# Patient Record
Sex: MALE | Race: WHITE
[De-identification: names, ages, dates, MRNs, and addresses within clinical notes are randomized per-mention and may not be internally consistent; named-entity substitution may affect disease eponyms.]

---

## 2018-02-25 ENCOUNTER — HOSPITAL ENCOUNTER (EMERGENCY)
Dept: HOSPITAL 47 - EC | Age: 43
Discharge: HOME | End: 2018-02-25
Payer: MEDICAID

## 2018-02-25 VITALS
HEART RATE: 76 BPM | TEMPERATURE: 97.6 F | SYSTOLIC BLOOD PRESSURE: 104 MMHG | RESPIRATION RATE: 16 BRPM | DIASTOLIC BLOOD PRESSURE: 61 MMHG

## 2018-02-25 DIAGNOSIS — Z79.891: ICD-10-CM

## 2018-02-25 DIAGNOSIS — F17.200: ICD-10-CM

## 2018-02-25 DIAGNOSIS — J18.1: Primary | ICD-10-CM

## 2018-02-25 DIAGNOSIS — R10.9: ICD-10-CM

## 2018-02-25 LAB
ALBUMIN SERPL-MCNC: 3.9 G/DL (ref 3.5–5)
ALP SERPL-CCNC: 39 U/L (ref 38–126)
ALT SERPL-CCNC: 27 U/L (ref 21–72)
AMYLASE SERPL-CCNC: 46 U/L (ref 30–110)
ANION GAP SERPL CALC-SCNC: 10 MMOL/L
AST SERPL-CCNC: 25 U/L (ref 17–59)
BASOPHILS # BLD AUTO: 0 K/UL (ref 0–0.2)
BASOPHILS NFR BLD AUTO: 1 %
BUN SERPL-SCNC: 15 MG/DL (ref 9–20)
CALCIUM SPEC-MCNC: 9 MG/DL (ref 8.4–10.2)
CHLORIDE SERPL-SCNC: 104 MMOL/L (ref 98–107)
CO2 SERPL-SCNC: 26 MMOL/L (ref 22–30)
EOSINOPHIL # BLD AUTO: 0 K/UL (ref 0–0.7)
EOSINOPHIL NFR BLD AUTO: 0 %
ERYTHROCYTE [DISTWIDTH] IN BLOOD BY AUTOMATED COUNT: 4.43 M/UL (ref 4.3–5.9)
ERYTHROCYTE [DISTWIDTH] IN BLOOD: 12.3 % (ref 11.5–15.5)
GLUCOSE SERPL-MCNC: 105 MG/DL (ref 74–99)
HCT VFR BLD AUTO: 41.4 % (ref 39–53)
HGB BLD-MCNC: 13.5 GM/DL (ref 13–17.5)
KETONES UR QL STRIP.AUTO: (no result)
LIPASE SERPL-CCNC: 29 U/L (ref 23–300)
LYMPHOCYTES # SPEC AUTO: 1.5 K/UL (ref 1–4.8)
LYMPHOCYTES NFR SPEC AUTO: 31 %
MCH RBC QN AUTO: 30.4 PG (ref 25–35)
MCHC RBC AUTO-ENTMCNC: 32.6 G/DL (ref 31–37)
MCV RBC AUTO: 93.3 FL (ref 80–100)
MONOCYTES # BLD AUTO: 0.5 K/UL (ref 0–1)
MONOCYTES NFR BLD AUTO: 10 %
NEUTROPHILS # BLD AUTO: 2.6 K/UL (ref 1.3–7.7)
NEUTROPHILS NFR BLD AUTO: 54 %
PH UR: 6 [PH] (ref 5–8)
PLATELET # BLD AUTO: 145 K/UL (ref 150–450)
POTASSIUM SERPL-SCNC: 4.4 MMOL/L (ref 3.5–5.1)
PROT SERPL-MCNC: 6.9 G/DL (ref 6.3–8.2)
SODIUM SERPL-SCNC: 140 MMOL/L (ref 137–145)
SP GR UR: 1.05 (ref 1–1.03)
UROBILINOGEN UR QL STRIP: <2 MG/DL (ref ?–2)
WBC # BLD AUTO: 4.9 K/UL (ref 3.8–10.6)

## 2018-02-25 PROCEDURE — 83690 ASSAY OF LIPASE: CPT

## 2018-02-25 PROCEDURE — 87040 BLOOD CULTURE FOR BACTERIA: CPT

## 2018-02-25 PROCEDURE — 85025 COMPLETE CBC W/AUTO DIFF WBC: CPT

## 2018-02-25 PROCEDURE — 74177 CT ABD & PELVIS W/CONTRAST: CPT

## 2018-02-25 PROCEDURE — 82150 ASSAY OF AMYLASE: CPT

## 2018-02-25 PROCEDURE — 36415 COLL VENOUS BLD VENIPUNCTURE: CPT

## 2018-02-25 PROCEDURE — 81003 URINALYSIS AUTO W/O SCOPE: CPT

## 2018-02-25 PROCEDURE — 99284 EMERGENCY DEPT VISIT MOD MDM: CPT

## 2018-02-25 PROCEDURE — 80053 COMPREHEN METABOLIC PANEL: CPT

## 2018-02-25 PROCEDURE — 96374 THER/PROPH/DIAG INJ IV PUSH: CPT

## 2018-02-25 PROCEDURE — 96361 HYDRATE IV INFUSION ADD-ON: CPT

## 2018-02-25 PROCEDURE — 71046 X-RAY EXAM CHEST 2 VIEWS: CPT

## 2018-02-25 NOTE — XR
EXAMINATION TYPE: XR chest 2V

 

DATE OF EXAM ORDERED: 2/25/2018

 

HISTORY: cough.

 

REFERENCE: None.

 

FINDINGS: 

The lungs are clear. Pleural spaces are clear. Heart size is normal.

 

IMPRESSION:

 

NORMAL CHEST.

## 2018-02-25 NOTE — ED
General Adult HPI





- General


Chief complaint: Abdominal Pain


Stated complaint: Abdominal pain


Time Seen by Provider: 02/25/18 10:01


Source: patient, RN notes reviewed


Mode of arrival: ambulatory


Limitations: no limitations





- History of Present Illness


Initial comments: 





43-year-old male presents to the emergency department with a chief complaint of 

centralized abdominal pain.  He states that he had fever like symptoms 

beginning of the week last week towards the end he started to develop the 

centralized abdominal pain is slowly gotten worse.  He states he's had some 

nausea.  He's had some diarrhea.  He denies any cough cold like symptoms.  He 

states he's never had any abdominal pain like this before.  He states that he 

did take a lot of Motrin last week he does not know if it's related to that.  

He was concerned due to his continued pain and history of fever so they thought 

that he should be seen.Patient denies any recent shortness of breath, chest pain

, back pain, vomiting, numbness or tingling, dysuria or hematuria, constipation

, headaches or visual changes, or any other current symptoms.





- Related Data


 Home Medications











 Medication  Instructions  Recorded  Confirmed


 


Buprenorphine HCl [Subutex] 4 mg SL DAILY 02/25/18 02/25/18








 Previous Rx's











 Medication  Instructions  Recorded


 


Ibuprofen [Motrin] 600 mg PO Q6HR PRN #20 tab 02/25/18


 


Levofloxacin [Levaquin] 750 mg PO DAILY #7 tab 02/25/18











 Allergies











Allergy/AdvReac Type Severity Reaction Status Date / Time


 


No Known Allergies Allergy   Verified 02/25/18 10:13














Review of Systems


ROS Statement: 


Those systems with pertinent positive or pertinent negative responses have been 

documented in the HPI.





ROS Other: All systems not noted in ROS Statement are negative.





Past Medical History


Past Medical History: No Reported History


History of Any Multi-Drug Resistant Organisms: None Reported


Past Surgical History: No Surgical Hx Reported


Past Psychological History: No Psychological Hx Reported


Smoking Status: Current every day smoker


Past Alcohol Use History: Rare


Past Drug Use History: None Reported





General Exam





- General Exam Comments


Initial Comments: 





General:  The patient is awake and alert, in no distress, and does not appear 

acutely ill. 


Eye:  Pupils are equal, round and reactive to light, extra-ocular movements are 

intact; there is normal conjunctiva bilaterally.  No signs of icterus.  


Ears, nose, mouth and throat:  There are moist mucous membranes.


Neck:  The neck is supple, there is no tenderness.


Cardiovascular:  There is a regular rate and rhythm. No murmur, rub or gallop 

is appreciated.


Respiratory:  Lungs are clear to auscultation, respirations are non-labored, 

breath sounds are equal.  No wheezes, stridor, rales, or rhonchi.


Gastrointestinal:  Soft, non-distended, non-tender abdomen without masses or 

organomegaly noted. There is no rebound or guarding present.  No CVA 

tenderness. Bowel sounds are unremarkable.


Back:  There is no tenderness to palpation in the midline. There is no obvious 

deformity. No rashes noted. 


Musculoskeletal:  Normal ROM, no tenderness, There is no pedal edema. There is 

no calf tenderness or swelling. Sensation intact. Pulses equal bilaterally 2+.  


Neurological:  CN II-XII intact, There are no obvious motor or sensory 

deficits. Coordination appears grossly intact. Speech is normal.


Skin:  Skin is warm and dry and no rashes or lesions are noted. 


Psychiatric:  Cooperative, appropriate mood & affect, normal judgment.  





Limitations: no limitations





Course


 Vital Signs











  02/25/18





  09:53


 


Temperature 98.6 F


 


Pulse Rate 115 H


 


Respiratory 18





Rate 


 


Blood Pressure 108/72


 


O2 Sat by Pulse 96





Oximetry 














Medical Decision Making





- Medical Decision Making





43-year-old male presents to the emergency department with a chief complaint of 

centralized abdominal pain.  At this time CAT scan and lab work is been 

reviewed.  There is suspicion for a left lower lobe pneumonia.  He has had the 

cough and the fever with abdominal pain.  We did discuss that we will start him 

on antibiotics for this as well as Motrin.  We discussed return parameters and 

follow-up and all they understood and management this plan.  They will be 

discharged.





- Lab Data


Result diagrams: 


 02/25/18 10:20





 02/25/18 10:20


 Lab Results











  02/25/18 02/25/18 02/25/18 Range/Units





  10:20 10:20 10:20 


 


WBC   4.9   (3.8-10.6)  k/uL


 


RBC   4.43   (4.30-5.90)  m/uL


 


Hgb   13.5   (13.0-17.5)  gm/dL


 


Hct   41.4   (39.0-53.0)  %


 


MCV   93.3   (80.0-100.0)  fL


 


MCH   30.4   (25.0-35.0)  pg


 


MCHC   32.6   (31.0-37.0)  g/dL


 


RDW   12.3   (11.5-15.5)  %


 


Plt Count   145 L   (150-450)  k/uL


 


Neutrophils %   54   %


 


Lymphocytes %   31   %


 


Monocytes %   10   %


 


Eosinophils %   0   %


 


Basophils %   1   %


 


Neutrophils #   2.6   (1.3-7.7)  k/uL


 


Lymphocytes #   1.5   (1.0-4.8)  k/uL


 


Monocytes #   0.5   (0-1.0)  k/uL


 


Eosinophils #   0.0   (0-0.7)  k/uL


 


Basophils #   0.0   (0-0.2)  k/uL


 


Sodium  140    (137-145)  mmol/L


 


Potassium  4.4    (3.5-5.1)  mmol/L


 


Chloride  104    ()  mmol/L


 


Carbon Dioxide  26    (22-30)  mmol/L


 


Anion Gap  10    mmol/L


 


BUN  15    (9-20)  mg/dL


 


Creatinine  0.63 L    (0.66-1.25)  mg/dL


 


Est GFR (MDRD) Af Amer  >60    (>60 ml/min/1.73 sqM)  


 


Est GFR (MDRD) Non-Af  >60    (>60 ml/min/1.73 sqM)  


 


Glucose  105 H    (74-99)  mg/dL


 


Calcium  9.0    (8.4-10.2)  mg/dL


 


Total Bilirubin  0.5    (0.2-1.3)  mg/dL


 


AST  25    (17-59)  U/L


 


ALT  27    (21-72)  U/L


 


Alkaline Phosphatase  39    ()  U/L


 


Total Protein  6.9    (6.3-8.2)  g/dL


 


Albumin  3.9    (3.5-5.0)  g/dL


 


Amylase  46    ()  U/L


 


Lipase  29    ()  U/L


 


Urine Color    Yellow  


 


Urine Appearance    Clear  (Clear)  


 


Urine pH    6.0  (5.0-8.0)  


 


Ur Specific Gravity    1.048 H  (1.001-1.035)  


 


Urine Protein    Trace H  (Negative)  


 


Urine Glucose (UA)    Negative  (Negative)  


 


Urine Ketones    1+ H  (Negative)  


 


Urine Blood    Negative  (Negative)  


 


Urine Nitrite    Negative  (Negative)  


 


Urine Bilirubin    Negative  (Negative)  


 


Urine Urobilinogen    <2.0  (<2.0)  mg/dL


 


Ur Leukocyte Esterase    Negative  (Negative)  














- Radiology Data


Radiology results: report reviewed, image reviewed





Disposition


Clinical Impression: 


 Left lower lobe pneumonia





Disposition: HOME SELF-CARE


Condition: Stable


Instructions:  Bacterial Pneumonia (ED)


Additional Instructions: 


Please use medication as discussed. Please follow up with family doctor if 

symptoms have not improved over the next two days. Please return to the 

emergency room if your symptoms increase or worsen or for any other concerns. 


Prescriptions: 


Ibuprofen [Motrin] 600 mg PO Q6HR PRN #20 tab


 PRN Reason: Pain


Levofloxacin [Levaquin] 750 mg PO DAILY #7 tab


Referrals: 


Jarred Mascorro MD [Primary Care Provider] - 1-2 days


Time of Disposition: 12:21

## 2018-02-25 NOTE — CT
EXAMINATION TYPE: CT abdomen pelvis w con

 

DATE OF EXAM: 2/25/2018

 

REFERENCE: NONE

 

HISTORY: Pain

HISTORY: Abdominal pain

 

REFERENCE: NONE

 

CT DLP: 423.10 mGy

Automated exposure control for dose reduction was used.

 

TECHNIQUE: Helical acquisition through the abdomen and pelvis was obtained following the oral ingesti
on of without Oral Contrast and following intravenous administration of 100 ml mL of Omnipaque 300. T
he data was reformatted in axial, coronal and sagittal projections.

 

FINDINGS:  There is a left lower lobe infiltrate. Right lung is clear. There is no pleural or pericar
dial fluid. The heart is not enlarged.

 

Within the abdomen, the gallbladder is partially contracted. The liver and spleen appear normal.

 

Both adrenal glands are normal. Both kidneys demonstrate function and appear morphologically normal.

 

The pancreas is unremarkable.

 

There is no significant retroperitoneal, iliac or inguinal adenopathy.

 

The bladder is unremarkable.

 

There is no significant diverticular change and there is no radiographic evidence of diverticulitis. 
The appendix is not visualized with certainty. There is moderate stool within the colon.

 

Small bowel loops are of normal caliber.

 

No free fluid and no free air is seen.

 

No bony lesion is seen.

 

IMPRESSION: 

1. LEFT LOWER LOBE INFILTRATE, LIKELY REPRESENTING PNEUMONIA.

2. NO ACUTE INTRA-ABDOMINAL INFLAMMATORY PROCESS.

3. NO EVIDENCE OF NEPHROLITHIASIS OR HYDRONEPHROSIS.

4. NONVISUALIZATION OF THE APPENDIX.

## 2018-02-28 ENCOUNTER — HOSPITAL ENCOUNTER (OUTPATIENT)
Dept: HOSPITAL 47 - ORWHC2ENDO | Age: 43
Discharge: HOME | End: 2018-02-28
Payer: MEDICAID

## 2018-02-28 VITALS — HEART RATE: 78 BPM | RESPIRATION RATE: 16 BRPM | SYSTOLIC BLOOD PRESSURE: 117 MMHG | DIASTOLIC BLOOD PRESSURE: 74 MMHG

## 2018-02-28 VITALS — TEMPERATURE: 98.4 F

## 2018-02-28 VITALS — BODY MASS INDEX: 20.4 KG/M2

## 2018-02-28 DIAGNOSIS — Z79.2: ICD-10-CM

## 2018-02-28 DIAGNOSIS — K29.50: Primary | ICD-10-CM

## 2018-02-28 DIAGNOSIS — F17.210: ICD-10-CM

## 2018-02-28 DIAGNOSIS — K25.9: ICD-10-CM

## 2018-02-28 DIAGNOSIS — J18.9: ICD-10-CM

## 2018-02-28 DIAGNOSIS — Z79.899: ICD-10-CM

## 2018-02-28 PROCEDURE — 88305 TISSUE EXAM BY PATHOLOGIST: CPT

## 2018-02-28 PROCEDURE — 43239 EGD BIOPSY SINGLE/MULTIPLE: CPT

## 2018-02-28 NOTE — P.PCN
Date of Procedure: 02/28/18


Procedure(s) Performed: 


Preoperative Dx: Epigastric abdominal pain recent abnormal CAT scan


Postoperative Dx: Gastritis with ulcerations


Procedure: EGD with Bx


Anesthesia: Sedation


Endoscopist: Dr. Giordano


Specimens: Antrum/gastric ulcer 


Endoscopic Procedure:   The patient was on the endoscopy table in the left 

decubitus position.  The Olympus gastroscope was inserted into the oropharynx 

and passed under direct visualization to the region of the third portion of the 

duodenum.  From that point the scope was slowly withdrawn inspecting all 

surfaces carefully.  There were no neoplastic inflammatory or polypoid lesions 

throughout the duodenum.  The pylorus was widely patent.  The stomach was 

carefully inspected.  There was evidence of gastric ulcerations present.  There 

were 2 main ulcers seen.  Both ulcers were less than 1 cm in size and appeared 

relatively superficial however the mucosa in the antrum and prepyloric regions 

were quite thickened.  Numerous biopsies were taken of the ulcer and around the 

ulcer site.  The proximal stomach appeared normal.  Retroflexion revealed a 

normal hiatus.  The esophagus was then carefully examined.  There were no 

neoplastic inflammatory or polypoid lesions throughout the visualized 

esophagus.  The patient was then taken to the recovery room in stable condition 

per anesthesia guidelines.


Recommendations: Await biopsy results.  Start antiacids.  Will require short-

term follow-up in 3-6 months.

## 2018-03-28 ENCOUNTER — HOSPITAL ENCOUNTER (OUTPATIENT)
Dept: HOSPITAL 47 - LABWHC1 | Age: 43
Discharge: HOME | End: 2018-03-28
Attending: FAMILY MEDICINE
Payer: MEDICAID

## 2018-03-28 DIAGNOSIS — F11.10: Primary | ICD-10-CM

## 2018-03-28 DIAGNOSIS — R68.89: ICD-10-CM

## 2018-03-28 LAB
ALBUMIN SERPL-MCNC: 4 G/DL (ref 3.5–5)
ALP SERPL-CCNC: 52 U/L (ref 38–126)
ALT SERPL-CCNC: 19 U/L (ref 21–72)
ANION GAP SERPL CALC-SCNC: 9 MMOL/L
AST SERPL-CCNC: 15 U/L (ref 17–59)
BASOPHILS # BLD AUTO: 0 K/UL (ref 0–0.2)
BASOPHILS NFR BLD AUTO: 1 %
BUN SERPL-SCNC: 10 MG/DL (ref 9–20)
CALCIUM SPEC-MCNC: 9.7 MG/DL (ref 8.4–10.2)
CHLORIDE SERPL-SCNC: 102 MMOL/L (ref 98–107)
CO2 SERPL-SCNC: 30 MMOL/L (ref 22–30)
EOSINOPHIL # BLD AUTO: 0.2 K/UL (ref 0–0.7)
EOSINOPHIL NFR BLD AUTO: 2 %
ERYTHROCYTE [DISTWIDTH] IN BLOOD BY AUTOMATED COUNT: 4.23 M/UL (ref 4.3–5.9)
ERYTHROCYTE [DISTWIDTH] IN BLOOD: 13.2 % (ref 11.5–15.5)
GLUCOSE SERPL-MCNC: 95 MG/DL (ref 74–99)
HCT VFR BLD AUTO: 37.9 % (ref 39–53)
HGB BLD-MCNC: 12.3 GM/DL (ref 13–17.5)
LYMPHOCYTES # SPEC AUTO: 2.1 K/UL (ref 1–4.8)
LYMPHOCYTES NFR SPEC AUTO: 25 %
MCH RBC QN AUTO: 29 PG (ref 25–35)
MCHC RBC AUTO-ENTMCNC: 32.4 G/DL (ref 31–37)
MCV RBC AUTO: 89.6 FL (ref 80–100)
MONOCYTES # BLD AUTO: 0.6 K/UL (ref 0–1)
MONOCYTES NFR BLD AUTO: 7 %
NEUTROPHILS # BLD AUTO: 5.3 K/UL (ref 1.3–7.7)
NEUTROPHILS NFR BLD AUTO: 64 %
PLATELET # BLD AUTO: 194 K/UL (ref 150–450)
POTASSIUM SERPL-SCNC: 4.7 MMOL/L (ref 3.5–5.1)
PROT SERPL-MCNC: 6.7 G/DL (ref 6.3–8.2)
SODIUM SERPL-SCNC: 141 MMOL/L (ref 137–145)
T4 FREE SERPL-MCNC: 1 NG/DL (ref 0.78–2.19)
WBC # BLD AUTO: 8.3 K/UL (ref 3.8–10.6)

## 2018-03-28 PROCEDURE — 84443 ASSAY THYROID STIM HORMONE: CPT

## 2018-03-28 PROCEDURE — 85025 COMPLETE CBC W/AUTO DIFF WBC: CPT

## 2018-03-28 PROCEDURE — 84439 ASSAY OF FREE THYROXINE: CPT

## 2018-03-28 PROCEDURE — 36415 COLL VENOUS BLD VENIPUNCTURE: CPT

## 2018-03-28 PROCEDURE — 80053 COMPREHEN METABOLIC PANEL: CPT

## 2020-09-27 ENCOUNTER — HOSPITAL ENCOUNTER (EMERGENCY)
Dept: HOSPITAL 47 - EC | Age: 45
Discharge: HOME | End: 2020-09-27
Payer: MEDICAID

## 2020-09-27 VITALS
SYSTOLIC BLOOD PRESSURE: 127 MMHG | RESPIRATION RATE: 16 BRPM | TEMPERATURE: 98.1 F | HEART RATE: 90 BPM | DIASTOLIC BLOOD PRESSURE: 85 MMHG

## 2020-09-27 DIAGNOSIS — K06.8: ICD-10-CM

## 2020-09-27 DIAGNOSIS — K08.89: Primary | ICD-10-CM

## 2020-09-27 PROCEDURE — 99282 EMERGENCY DEPT VISIT SF MDM: CPT

## 2020-09-27 NOTE — ED
ENT HPI





- General


Stated complaint: Dental Pain


Time Seen by Provider: 09/27/20 14:41





- History of Present Illness


Initial comments: 





Patient is a 45-year-old male presenting to the emergency department with a 

chief complaint of dental pain.  Patient states she does wear dentures and for 

the last few days he developed increased irritation in the left upper jaw.  

Patient reports today's noticed there is a "blood blister" on the [place].  

Patient reports it is tender to touch but denies any drainage from the region.  

She denies any night sweats fevers and chills.  Denies any facial swelling, 

nausea vomiting diarrhea.  Denies taking medication to alleviate the symptoms.





- Related Data


                                Home Medications











 Medication  Instructions  Recorded  Confirmed


 


Suboxone Tablet 4 mg PO DAILY 02/27/18 02/27/18








                                  Previous Rx's











 Medication  Instructions  Recorded


 


Ibuprofen [Motrin] 600 mg PO Q6HR PRN #20 tab 02/25/18


 


Levofloxacin [Levaquin] 750 mg PO DAILY #7 tab 02/25/18


 


Omeprazole [PriLOSEC] 20 mg PO AC-BRKFST #90 cap 02/28/18


 


Sucralfate [Carafate] 1 gm PO ACHS #120 tab 02/28/18











                                    Allergies











Allergy/AdvReac Type Severity Reaction Status Date / Time


 


No Known Allergies Allergy   Verified 02/27/18 13:43














Review of Systems


ROS Statement: 


Those systems with pertinent positive or pertinent negative responses have been 

documented in the HPI.





ROS Other: All systems not noted in ROS Statement are negative.





Past Medical History


Past Medical History: Pneumonia


Additional Past Medical History / Comment(s): abdominal pain, pneumonia left 

lower lung-on rx,


History of Any Multi-Drug Resistant Organisms: None Reported


Past Surgical History: No Surgical Hx Reported


Additional Past Surgical History / Comment(s): oral surgery


Past Anesthesia/Blood Transfusion Reactions: No Reported Reaction


Past Psychological History: No Psychological Hx Reported


Past Alcohol Use History: None Reported


Additional Past Alcohol Use History / Comment(s): smokes < 1 PPD, for 30 yrs


Past Drug Use History: None Reported





- Past Family History


  ** Mother


Family Medical History: Cancer





General Exam


Limitations: no limitations


General appearance: alert, in no apparent distress


Head exam: Present: atraumatic, normocephalic, normal inspection


Eye exam: Present: normal appearance, PERRL, EOMI


Pupils: Present: normal accommodation


ENT exam: Present: normal exam, mucous membranes moist, TM's normal bilaterally,

 normal external ear exam.  Absent: normal oropharynx (Gumline irritation in the

 left upper jaw.  No signs of an abscess at this time.)


Neck exam: Present: normal inspection, full ROM.  Absent: tenderness, 

meningismus


Respiratory exam: Present: normal lung sounds bilaterally.  Absent: respiratory 

distress, wheezes, rales


Cardiovascular Exam: Present: regular rate, normal rhythm, normal heart sounds


Extremities exam: Present: normal inspection, full ROM, normal capillary refill.

  Absent: tenderness


Back exam: Present: normal inspection, full ROM.  Absent: tenderness, CVA 

tenderness (R), CVA tenderness (L)


Neurological exam: Present: alert, oriented X3, normal gait


Psychiatric exam: Present: normal affect, normal mood.  Absent: anxious, flat 

affect, manic


Skin exam: Present: warm, dry, intact, normal color





Course


                                   Vital Signs











  09/27/20





  14:59


 


Temperature 98.1 F


 


Pulse Rate 90


 


Respiratory 16





Rate 


 


Blood Pressure 127/85


 


O2 Sat by Pulse 97





Oximetry 














Medical Decision Making





- Medical Decision Making





Patient is a 45-year-old male presenting to the emergency department with chief 

complaint dental pain.  On physical examination, patient has a small little 

hematoma and area of gingival irritation the left upper jaw.  No signs of 

periapical abscess.  Patient will be started on Augmentin and discharged with a 

10 day course of Augmentin.  He will he has an appointment with his oral surgeon

 this week.  Strict return parameters were thoroughly discussed with patient was

 understanding and agreeable.  Case discussed with physician.





Disposition


Clinical Impression: 


 Pain, dental, Inflamed gum





Disposition: HOME SELF-CARE


Condition: Good


Instructions (If sedation given, give patient instructions):  Toothache (ED)


Additional Instructions: 


Follow-up with your oral surgeon.  Return to emergency department if symptoms 

worsen.  Take a medication as directed.


Is patient prescribed a controlled substance at d/c from ED?: No


Referrals: 


Jarred Mascorro MD [Primary Care Provider] - 1-2 days


Time of Disposition: 14:55